# Patient Record
Sex: MALE | Race: WHITE | NOT HISPANIC OR LATINO | Employment: FULL TIME | URBAN - METROPOLITAN AREA
[De-identification: names, ages, dates, MRNs, and addresses within clinical notes are randomized per-mention and may not be internally consistent; named-entity substitution may affect disease eponyms.]

---

## 2023-04-07 ENCOUNTER — OFFICE VISIT (OUTPATIENT)
Dept: URGENT CARE | Facility: CLINIC | Age: 28
End: 2023-04-07
Payer: OTHER MISCELLANEOUS

## 2023-04-07 VITALS
SYSTOLIC BLOOD PRESSURE: 128 MMHG | WEIGHT: 315 LBS | HEART RATE: 80 BPM | OXYGEN SATURATION: 99 % | HEIGHT: 68 IN | BODY MASS INDEX: 47.74 KG/M2 | TEMPERATURE: 97 F | RESPIRATION RATE: 20 BRPM | DIASTOLIC BLOOD PRESSURE: 70 MMHG
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
## 2023-04-07 DIAGNOSIS — S89.91XA INJURY OF RIGHT KNEE, INITIAL ENCOUNTER: Primary | ICD-10-CM

## 2023-04-07 DIAGNOSIS — Z02.6 ENCOUNTER RELATED TO WORKER'S COMPENSATION CLAIM: ICD-10-CM

## 2023-04-07 PROCEDURE — 73562 XR KNEE 3 VIEW RIGHT: ICD-10-PCS | Mod: RT,S$GLB,, | Performed by: RADIOLOGY

## 2023-04-07 PROCEDURE — 99203 OFFICE O/P NEW LOW 30 MIN: CPT | Mod: S$GLB,,, | Performed by: NURSE PRACTITIONER

## 2023-04-07 PROCEDURE — 73562 X-RAY EXAM OF KNEE 3: CPT | Mod: RT,S$GLB,, | Performed by: RADIOLOGY

## 2023-04-07 PROCEDURE — 99203 PR OFFICE/OUTPT VISIT, NEW, LEVL III, 30-44 MIN: ICD-10-PCS | Mod: S$GLB,,, | Performed by: NURSE PRACTITIONER

## 2023-04-07 RX ORDER — BUSPIRONE HYDROCHLORIDE 10 MG/1
10 TABLET ORAL 2 TIMES DAILY
COMMUNITY
Start: 2023-03-27

## 2023-04-07 RX ORDER — SPIRONOLACTONE 100 MG/1
100 TABLET, FILM COATED ORAL
COMMUNITY
Start: 2023-03-27

## 2023-04-07 RX ORDER — ESTRADIOL 2 MG/1
4 TABLET ORAL
COMMUNITY
Start: 2023-03-27

## 2023-04-07 RX ORDER — FLUOXETINE HYDROCHLORIDE 20 MG/1
60 CAPSULE ORAL
COMMUNITY
Start: 2023-03-27

## 2023-04-07 RX ORDER — CLONAZEPAM 1 MG/1
1 TABLET ORAL
COMMUNITY

## 2023-04-07 RX ORDER — SPIRONOLACTONE 50 MG/1
50 TABLET, FILM COATED ORAL
COMMUNITY
Start: 2023-02-28

## 2023-04-07 NOTE — PROGRESS NOTES
Subjective:      Patient ID: Francis Pedro is a 28 y.o. male.    Chief Complaint: Knee Pain    Pt startes he was at work  ( he is a  at the [employer] )  when incident occurred at 525 pm this evening, he was going to the restroom to get some toilet paper when he twisted his right knee  and felt a sharp pain ,  pain scale 3/10, no meds taken.      Knee Injury  This is a new problem. The current episode started today. The problem occurs constantly. The problem has been rapidly worsening. Associated symptoms include arthralgias and joint swelling. Pertinent negatives include no abdominal pain, chest pain, chills, coughing, fatigue, fever, nausea, neck pain, rash or vomiting. The symptoms are aggravated by walking. He has tried nothing for the symptoms.     Constitution: Negative. Negative for chills, fatigue and fever.   HENT:  Negative for ear pain, ear discharge, facial swelling and sinus pressure.    Neck: Negative for neck pain, neck stiffness and painful lymph nodes.   Cardiovascular: Negative.  Negative for chest pain and sob on exertion.   Eyes: Negative.  Negative for eye itching, eye pain and eye redness.   Respiratory: Negative.  Negative for chest tightness, cough, shortness of breath, wheezing and asthma.    Gastrointestinal: Negative.  Negative for abdominal pain, nausea and vomiting.   Endocrine: negative. excessive thirst.   Genitourinary: Negative.  Negative for dysuria, frequency, urgency and flank pain.   Musculoskeletal:  Positive for pain, trauma, joint pain, joint swelling and abnormal ROM of joint.   Skin: Negative.  Negative for rash, wound, lesion and hives.   Allergic/Immunologic: Negative.  Negative for eczema, asthma, hives, itching and sneezing.   Neurological: Negative.  Negative for dizziness, passing out, disorientation and altered mental status.   Hematologic/Lymphatic: Negative.  Negative for swollen lymph nodes.   Psychiatric/Behavioral: Negative.  Negative for altered  mental status, disorientation and confusion.    Objective:     Physical Exam  Constitutional:       General: He is not in acute distress.     Appearance: Normal appearance. He is not ill-appearing, toxic-appearing or diaphoretic.   Pulmonary:      Effort: Pulmonary effort is normal.      Breath sounds: Normal breath sounds.   Musculoskeletal:         General: Swelling, tenderness and signs of injury present.      Right knee: Swelling present. Tenderness present over the MCL.      Instability Tests: Anterior drawer test negative. Posterior drawer test negative.        Legs:       Comments: Patient is able to bear weight.  Good passive and active range of motion.   Neurological:      General: No focal deficit present.      Mental Status: He is alert.   Psychiatric:         Mood and Affect: Mood normal.      Assessment:      1. Injury of right knee, initial encounter    2. Injury caused by twisting    3. Encounter related to worker's compensation claim      Plan:     FOLLOWUP  Follow up if symptoms worsen or fail to improve, for PLEASE CONTACT PCP OR CONTACT THE EMERGENCY ROOM..     PATIENT INSTRUCTIONS  Patient Instructions   Follow-up Monday as scheduled with [facility].    INSTRUCTIONS:  - Rest.  - Drink plenty of fluids.  - Take Tylenol and/or Ibuprofen as directed as needed for fever/pain.  Do not take more than the recommended dose.  - follow up with your PCP within the next 1-2 weeks as needed.  - You must understand that you have received an Urgent Care treatment only and that you may be released before all of your medical problems are known or treated.   - You, the patient, will arrange for follow up care as instructed.   - If your condition worsens or fails to improve we recommend that you receive another evaluation at the ER immediately or contact your PCP to discuss your concerns.   - You can call [phone] or [phone] to help schedule an appointment with the  appropriate provider.     -If you smoke cigarettes, it would be beneficial for you to stop.          THANK YOU FOR ALLOWING ME TO PARTICIPATE IN YOUR HEALTHCARE,     Kameron Beaulieu NP             Follow up if symptoms worsen or fail to improve, for PLEASE CONTACT PCP OR CONTACT THE EMERGENCY ROOM..

## 2023-04-07 NOTE — LETTER
April 7, 2023      Urgent Care - [place] [address], [address]  [facility] [address]  Phone: [phone]  Fax: [phone]       Patient: Francis Pedro   YOB: 1995  Date of Visit: 04/07/2023    To Whom It May Concern:    Nadia Pedro  was at [facility] on 04/07/2023. The patient may return to work/school on 4/10/23- 4/12/23 pending eval with [facility], Pt has to follow up with [facility] dept to be cleared to return to work before returning .  If you have any questions or concerns, or if I can be of further assistance, please do not hesitate to contact me.    Sincerely,

## 2023-04-07 NOTE — PROGRESS NOTES
Subjective:      Patient ID: Francis Pedro is a 28 y.o. male.    Chief Complaint: No chief complaint on file.    HPI  ROS  Objective:     Physical Exam   Assessment:      No diagnosis found.  Plan:                 No follow-ups on file.

## 2023-04-08 NOTE — PATIENT INSTRUCTIONS
Follow-up Monday as scheduled with [facility].    INSTRUCTIONS:  - Rest.  - Drink plenty of fluids.  - Take Tylenol and/or Ibuprofen as directed as needed for fever/pain.  Do not take more than the recommended dose.  - follow up with your PCP within the next 1-2 weeks as needed.  - You must understand that you have received an Urgent Care treatment only and that you may be released before all of your medical problems are known or treated.   - You, the patient, will arrange for follow up care as instructed.   - If your condition worsens or fails to improve we recommend that you receive another evaluation at the ER immediately or contact your PCP to discuss your concerns.   - You can call [phone] or [phone] to help schedule an appointment with the appropriate provider.     -If you smoke cigarettes, it would be beneficial for you to stop.

## 2023-04-10 ENCOUNTER — OFFICE VISIT (OUTPATIENT)
Dept: URGENT CARE | Facility: CLINIC | Age: 28
End: 2023-04-10
Payer: OTHER MISCELLANEOUS

## 2023-04-10 VITALS
HEART RATE: 67 BPM | OXYGEN SATURATION: 97 % | TEMPERATURE: 98 F | BODY MASS INDEX: 47.74 KG/M2 | DIASTOLIC BLOOD PRESSURE: 80 MMHG | RESPIRATION RATE: 18 BRPM | HEIGHT: 68 IN | WEIGHT: 315 LBS | SYSTOLIC BLOOD PRESSURE: 124 MMHG

## 2023-04-10 DIAGNOSIS — S89.91XA INJURY OF RIGHT KNEE, INITIAL ENCOUNTER: Primary | ICD-10-CM

## 2023-04-10 PROCEDURE — 99203 OFFICE O/P NEW LOW 30 MIN: CPT | Mod: S$GLB,,, | Performed by: FAMILY MEDICINE

## 2023-04-10 PROCEDURE — 99203 PR OFFICE/OUTPT VISIT, NEW, LEVL III, 30-44 MIN: ICD-10-PCS | Mod: S$GLB,,, | Performed by: FAMILY MEDICINE

## 2023-04-10 RX ORDER — METHYLPREDNISOLONE 4 MG/1
TABLET ORAL
Qty: 1 EACH | Refills: 0 | Status: SHIPPED | OUTPATIENT
Start: 2023-04-10

## 2023-04-10 RX ORDER — NAPROXEN 500 MG/1
500 TABLET ORAL 2 TIMES DAILY WITH MEALS
Qty: 30 TABLET | Refills: 0 | Status: SHIPPED | OUTPATIENT
Start: 2023-04-10 | End: 2024-04-09

## 2023-04-10 NOTE — LETTER
Urgent Care - [place] [address], [address]  [facility] [address]  Phone: [phone]  Fax: [phone]  [employer] [facility]: [id number]    Pt Name: Francis Pedro  Injury Date: 04/07/2023   Employee ID: [id number] Date of First Treatment: 04/10/2023   Company: [employer]      Appointment Time: 09:45 AM Arrived: 945   Provider: Eric Alejandra MD Time Out:1115     Office Treatment:   1. Injury of right knee, initial encounter      Medications Ordered This Encounter   Medications    methylPREDNISolone (MEDROL DOSEPACK) 4 mg tablet    naproxen (NAPROSYN) 500 MG tablet      Patient Instructions: Attention not to aggravate affected area, Apply ice 24-48 hours then apply heat/warm soaks, Use splint as directed    Restrictions: No Prolonged standing/walking, Avoid climbing/kneeling/squatting     Return Appointment: 4/18 or sooner if needed     You have a work related injury. Medical care and treatment required as a result of a work-related injury  should be focused on restoring functional ability required to meet your daily and work activities and return to work, while striving to restore your health to pre-injury status in so far as is feasible.  If Rx a medication that can be sedating, DO NOT TAKE DURING YOUR WORK DAY.    Some OTC measures to help in recovery(if no allergies to, renal issues or pregnant):  Tylenol 325mg 3x per day  Ibuprofen 400mg 3x per day OR Aleve regular strength one tablet 2x per day  Take Pepcid 20mg BID  If applicable or discussed: Magnesium OTC daily; Topical Voltaren Gel; Lidocaine patches  Massage area if possible  Resting of the injured area  Ice for ankle, wrist or elbow injury  Elevation of the injured area if applicable  Heating pad for muscle injury  Stretching/ROM exercises as described in clinic.   ** BE ADVISED: You should be in regular contact with your W/C  to know the status of your claim and /or (if any)pending referrals**

## 2023-04-10 NOTE — PROGRESS NOTES
Subjective:      Patient ID: Francis Pedro is a 28 y.o. male.    Chief Complaint: Knee Pain    Pt returns for work related injury with signs of improvement. Pt's ambulation and gait appear much improved compared to initial visit and he states the overall pain threshold is drastically reduced. However, he states that there is still increased pain with bending, twisting or standing for prolonged periods. Has been taking motrin q6h as directed. PS 2/10    4/7/2023: Pt startes he was at work  ( he is a  at [employer] )  when incident occurred at 525 pm this evening, he was going to the restroom to get some toilet paper when he twisted his right knee and felt a sharp pain. Pain scale 3/10, no meds taken.  ROS  Objective:     Physical Exam  Musculoskeletal:         General: Tenderness present.      Right knee: Tenderness present over the medial joint line. No ACL or PCL tenderness. No MCL laxity.      Instability Tests: Medial Perry test positive.        Legs:         Thessaly + on right.   Assessment:      1. Injury of right knee, initial encounter      Plan:   have patient return 1 week.  Getting knee sleeve help with compression.  Restrictions placed.    Medications Ordered This Encounter   Medications    methylPREDNISolone (MEDROL DOSEPACK) 4 mg tablet     Sig: Dispense one radha. use as directed     Dispense:  1 each     Refill:  0    naproxen (NAPROSYN) 500 MG tablet     Sig: Take 1 tablet (500 mg total) by mouth 2 (two) times daily with meals.     Dispense:  30 tablet     Refill:  0     Patient Instructions: Attention not to aggravate affected area, Apply ice 24-48 hours then apply heat/warm soaks, Use splint as directed   Restrictions: No Prolonged standing/walking, Avoid climbing/kneeling/squatting  No follow-ups on file.      XR KNEE 3 VIEW RIGHT    Result Date: 4/7/2023  EXAMINATION: XR KNEE 3 VIEW RIGHT CLINICAL HISTORY: Overexertion from prolonged static or awkward postures, initial encounter  TECHNIQUE: AP, lateral, and Merchant views of the right knee were performed. COMPARISON: None FINDINGS: There is no acute fracture or dislocation. Alignment is normal. Joint spaces are preserved. No joint effusion.     No acute osseous abnormality. Electronically signed by: Arnulfo Lagunas Date:    04/07/2023 Time:    19:02

## 2023-04-24 ENCOUNTER — TELEPHONE (OUTPATIENT)
Dept: URGENT CARE | Facility: CLINIC | Age: 28
End: 2023-04-24
Payer: OTHER MISCELLANEOUS

## 2023-04-24 NOTE — TELEPHONE ENCOUNTER
Called patient and left a voice message and call back number regarding the missed [facility] Appointment.    AFG

## 2023-04-28 ENCOUNTER — OFFICE VISIT (OUTPATIENT)
Dept: URGENT CARE | Facility: CLINIC | Age: 28
End: 2023-04-28
Payer: OTHER MISCELLANEOUS

## 2023-04-28 VITALS
HEART RATE: 75 BPM | SYSTOLIC BLOOD PRESSURE: 130 MMHG | RESPIRATION RATE: 16 BRPM | WEIGHT: 315 LBS | HEIGHT: 68 IN | TEMPERATURE: 98 F | OXYGEN SATURATION: 99 % | BODY MASS INDEX: 47.74 KG/M2 | DIASTOLIC BLOOD PRESSURE: 86 MMHG
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
## 2023-04-28 DIAGNOSIS — S89.91XD INJURY OF RIGHT KNEE, SUBSEQUENT ENCOUNTER: ICD-10-CM

## 2023-04-28 PROCEDURE — 99213 PR OFFICE/OUTPT VISIT, EST, LEVL III, 20-29 MIN: ICD-10-PCS | Mod: S$GLB,,, | Performed by: FAMILY MEDICINE

## 2023-04-28 PROCEDURE — 99213 OFFICE O/P EST LOW 20 MIN: CPT | Mod: S$GLB,,, | Performed by: FAMILY MEDICINE

## 2023-04-28 RX ORDER — NAPROXEN 500 MG/1
500 TABLET ORAL 2 TIMES DAILY WITH MEALS
Qty: 30 TABLET | Refills: 1 | Status: SHIPPED | OUTPATIENT
Start: 2023-04-28 | End: 2024-04-27

## 2023-04-28 NOTE — PROGRESS NOTES
Subjective:      Patient ID: Francis Pedro is a 28 y.o. male.    Chief Complaint: Knee Pain    Pt presents to urgent care for a w/c follow up.  He works for [email or web address].  He states that his knee is feeling better.  He does have a little pain when he moves his knee.  Pain scale- 1 or 2. He finished both medications that he was given at his last visit.  He was wearing his knee brace and now he is keeping it on hand as he needs it he wears it.  DOI- 4/7/2023.    Knee Pain   The incident occurred more than 1 week ago. The incident occurred at work. There was no injury mechanism. The pain is present in the right knee. The pain is at a severity of 2/10. The pain is mild. The pain has been Intermittent since onset. He reports no foreign bodies present. Nothing aggravates the symptoms. He has tried nothing for the symptoms. The treatment provided no relief.   ROS  Objective:     Physical Exam   Medial knee pain with squatting and Thessaly maneuver.  But very tolerable  Assessment:      1. Injury caused by twisting    2. Injury of right knee, subsequent encounter      Plan:   Will see patient back in 6 weeks.  Advised taking anti-inflammatories this patient has stopped after 6 days due to her near relief of symptoms.    Medications Ordered This Encounter   Medications    naproxen (NAPROSYN) 500 MG tablet     Sig: Take 1 tablet (500 mg total) by mouth 2 (two) times daily with meals.     Dispense:  30 tablet     Refill:  1     Patient Instructions: Daily home exercises/warm soaks, Attention not to aggravate affected area, Use splint as directed   Restrictions: Regular Duty  No follow-ups on file.

## 2023-04-28 NOTE — LETTER
Urgent Care - [place] [address], [address]  [facility] [address]  Phone: [phone]  Fax: [phone]  [facility]: [id number]    Pt Name: Francis Pedro  Injury Date: 04/07/2023   Employee ID: [id number] Date ofTreatment: 04/28/2023   Company: [employer]      Appointment Time: 02:30 PM Arrived: 245   Provider: Eric Alejandra MD Time Out:345     Office Treatment:   1. Injury caused by twisting    2. Injury of right knee, subsequent encounter      Medications Ordered This Encounter   Medications    naproxen (NAPROSYN) 500 MG tablet      Patient Instructions: Daily home exercises/warm soaks, Attention not to aggravate affected area, Use splint as directed    Restrictions: Regular Duty     Return Appointment: 5/8 or sooner if needed

## 2023-12-03 ENCOUNTER — OFFICE VISIT (OUTPATIENT)
Dept: URGENT CARE | Facility: CLINIC | Age: 28
End: 2023-12-03
Payer: MEDICAID

## 2023-12-03 VITALS
WEIGHT: 315 LBS | BODY MASS INDEX: 47.74 KG/M2 | HEART RATE: 107 BPM | OXYGEN SATURATION: 98 % | TEMPERATURE: 98 F | RESPIRATION RATE: 18 BRPM | SYSTOLIC BLOOD PRESSURE: 136 MMHG | DIASTOLIC BLOOD PRESSURE: 90 MMHG | HEIGHT: 68 IN

## 2023-12-03 DIAGNOSIS — J11.1 INFLUENZA: Primary | ICD-10-CM

## 2023-12-03 DIAGNOSIS — R05.9 COUGH, UNSPECIFIED TYPE: ICD-10-CM

## 2023-12-03 LAB
CTP QC/QA: YES
CTP QC/QA: YES
POC MOLECULAR INFLUENZA A AGN: NEGATIVE
POC MOLECULAR INFLUENZA B AGN: POSITIVE
SARS-COV-2 AG RESP QL IA.RAPID: NEGATIVE

## 2023-12-03 PROCEDURE — 99213 OFFICE O/P EST LOW 20 MIN: CPT | Mod: S$GLB,,, | Performed by: NURSE PRACTITIONER

## 2023-12-03 PROCEDURE — 87811 SARS CORONAVIRUS 2 ANTIGEN POCT, MANUAL READ: ICD-10-PCS | Mod: QW,S$GLB,, | Performed by: NURSE PRACTITIONER

## 2023-12-03 PROCEDURE — 99213 PR OFFICE/OUTPT VISIT, EST, LEVL III, 20-29 MIN: ICD-10-PCS | Mod: S$GLB,,, | Performed by: NURSE PRACTITIONER

## 2023-12-03 PROCEDURE — 87502 INFLUENZA DNA AMP PROBE: CPT | Mod: QW,S$GLB,, | Performed by: NURSE PRACTITIONER

## 2023-12-03 PROCEDURE — 87502 POCT INFLUENZA A/B MOLECULAR: ICD-10-PCS | Mod: QW,S$GLB,, | Performed by: NURSE PRACTITIONER

## 2023-12-03 PROCEDURE — 87811 SARS-COV-2 COVID19 W/OPTIC: CPT | Mod: QW,S$GLB,, | Performed by: NURSE PRACTITIONER

## 2023-12-03 RX ORDER — OSELTAMIVIR PHOSPHATE 75 MG/1
75 CAPSULE ORAL 2 TIMES DAILY
Qty: 10 CAPSULE | Refills: 0 | Status: SHIPPED | OUTPATIENT
Start: 2023-12-03 | End: 2023-12-08

## 2023-12-03 NOTE — PROGRESS NOTES
"Subjective:      Patient ID: Francis Pedro is a 28 y.o. adult.    Vitals:  height is 5' 8" (1.727 m) and weight is 144.2 kg (318 lb) (abnormal). Her oral temperature is 97.7 °F (36.5 °C). Her blood pressure is 136/90 (abnormal) and her pulse is 107. Her respiration is 18 and oxygen saturation is 98%.     Chief Complaint: Sinus Problem    Patient presents to clinic with complaint of fever, chills, congestion, fatigue, and body aches. Symptoms started 5 days ago.    Sinus Problem  This is a new problem. The current episode started in the past 7 days. The problem is unchanged. Her pain is at a severity of 0/10. She is experiencing no pain. Associated symptoms include chills and congestion. Pertinent negatives include no coughing, diaphoresis, ear pain, headaches, hoarse voice, neck pain, shortness of breath, sinus pressure, sneezing, sore throat or swollen glands. Past treatments include oral decongestants.       Constitution: Positive for chills. Negative for sweating.   HENT:  Positive for congestion. Negative for ear pain, sinus pressure and sore throat.    Neck: Negative for neck pain.   Respiratory:  Negative for cough and shortness of breath.    Allergic/Immunologic: Negative for sneezing.   Neurological:  Negative for headaches.      Objective:     Physical Exam   Constitutional: She is oriented to person, place, and time. She appears well-developed. She is cooperative.  Non-toxic appearance. She does not appear ill. No distress.   HENT:   Head: Normocephalic and atraumatic.   Ears:   Right Ear: Hearing, tympanic membrane, external ear and ear canal normal.   Left Ear: Hearing, tympanic membrane, external ear and ear canal normal.   Nose: Nose normal. No mucosal edema, rhinorrhea or nasal deformity. No epistaxis. Right sinus exhibits no maxillary sinus tenderness and no frontal sinus tenderness. Left sinus exhibits no maxillary sinus tenderness and no frontal sinus tenderness.   Mouth/Throat: Uvula is " midline, oropharynx is clear and moist and mucous membranes are normal. No trismus in the jaw. Normal dentition. No uvula swelling. No oropharyngeal exudate, posterior oropharyngeal edema or posterior oropharyngeal erythema.   Eyes: Conjunctivae and lids are normal. No scleral icterus.   Neck: Trachea normal and phonation normal. Neck supple. No edema present. No erythema present. No neck rigidity present.   Cardiovascular: Normal rate, regular rhythm, normal heart sounds and normal pulses.   Pulmonary/Chest: Effort normal and breath sounds normal. No respiratory distress. She has no decreased breath sounds. She has no rhonchi.   Abdominal: Normal appearance.   Musculoskeletal: Normal range of motion.         General: No deformity. Normal range of motion.   Neurological: She is alert and oriented to person, place, and time. She exhibits normal muscle tone. Coordination normal.   Skin: Skin is warm, dry, intact, not diaphoretic and not pale.   Psychiatric: Her speech is normal and behavior is normal. Judgment and thought content normal.   Nursing note and vitals reviewed.      Assessment:     1. Influenza    2. Cough, unspecified type      Results for orders placed or performed in visit on 12/03/23   SARS Coronavirus 2 Antigen, POCT Manual Read   Result Value Ref Range    SARS Coronavirus 2 Antigen Negative Negative     Acceptable Yes    POCT Influenza A/B MOLECULAR   Result Value Ref Range    POC Molecular Influenza A Ag Negative Negative, Not Reported    POC Molecular Influenza B Ag Positive (A) Negative, Not Reported     Acceptable Yes          Plan:       Influenza  -     oseltamivir (TAMIFLU) 75 MG capsule; Take 1 capsule (75 mg total) by mouth 2 (two) times daily. for 5 days  Dispense: 10 capsule; Refill: 0    Cough, unspecified type  -     SARS Coronavirus 2 Antigen, POCT Manual Read  -     POCT Influenza A/B MOLECULAR      Patient Instructions   INSTRUCTIONS:  - Rest.  - Drink  plenty of fluids.  - Take Tylenol and/or Ibuprofen as directed as needed for fever/pain.  Do not take more than the recommended dose.  - follow up with your PCP within the next 1-2 weeks as needed.  - You must understand that you have received an Urgent Care treatment only and that you may be released before all of your medical problems are known or treated.   - You, the patient, will arrange for follow up care as instructed.   - If your condition worsens or fails to improve we recommend that you receive another evaluation at the ER immediately or contact your PCP to discuss your concerns.   - You can call [phone] or [phone] to help schedule an appointment with the appropriate provider.     -If you smoke cigarettes, it would be beneficial for you to stop.

## 2023-12-03 NOTE — PATIENT INSTRUCTIONS
